# Patient Record
Sex: MALE | URBAN - METROPOLITAN AREA
[De-identification: names, ages, dates, MRNs, and addresses within clinical notes are randomized per-mention and may not be internally consistent; named-entity substitution may affect disease eponyms.]

---

## 2024-01-11 ENCOUNTER — HOSPITAL ENCOUNTER (OUTPATIENT)
Dept: PREADMISSION TESTING | Age: 54
Discharge: HOME | End: 2024-01-11
Payer: COMMERCIAL

## 2024-01-11 VITALS
TEMPERATURE: 97.1 F | HEART RATE: 67 BPM | WEIGHT: 236 LBS | DIASTOLIC BLOOD PRESSURE: 90 MMHG | OXYGEN SATURATION: 100 % | RESPIRATION RATE: 16 BRPM | SYSTOLIC BLOOD PRESSURE: 146 MMHG | HEIGHT: 74 IN | BODY MASS INDEX: 30.29 KG/M2

## 2024-01-11 LAB
AMORPH SED URNS QL MICRO: ABNORMAL
BILIRUB UR QL STRIP: NEGATIVE
CLARITY UR: ABNORMAL
COLOR UR: YELLOW
EPI CELLS #/AREA URNS HPF: ABNORMAL /HPF (ref 0–5)
GLUCOSE UR STRIP-MCNC: NEGATIVE MG/DL
HGB UR QL STRIP.AUTO: ABNORMAL
INR PPP: 1
KETONES UR STRIP-MCNC: NEGATIVE MG/DL
LEUKOCYTE ESTERASE UR QL STRIP: NEGATIVE
NITRITE UR QL STRIP: NEGATIVE
PARTIAL THROMBOPLASTIN TIME: 26.4 SEC (ref 23.9–33.8)
PH UR STRIP: 5.5 [PH] (ref 5–8)
PROT UR STRIP-MCNC: ABNORMAL MG/DL
PROTHROMBIN TIME: 13.4 SEC (ref 11.5–14.2)
RBC #/AREA URNS HPF: ABNORMAL /HPF (ref 0–2)
SP GR UR STRIP: 1.03 (ref 1–1.03)
UROBILINOGEN UR STRIP-ACNC: NORMAL EU/DL (ref 0–1)
WBC #/AREA URNS HPF: ABNORMAL /HPF (ref 0–5)

## 2024-01-11 PROCEDURE — 36415 COLL VENOUS BLD VENIPUNCTURE: CPT

## 2024-01-11 PROCEDURE — 87077 CULTURE AEROBIC IDENTIFY: CPT

## 2024-01-11 PROCEDURE — 87186 SC STD MICRODIL/AGAR DIL: CPT

## 2024-01-11 PROCEDURE — 85730 THROMBOPLASTIN TIME PARTIAL: CPT

## 2024-01-11 PROCEDURE — 85610 PROTHROMBIN TIME: CPT

## 2024-01-11 PROCEDURE — 87086 URINE CULTURE/COLONY COUNT: CPT

## 2024-01-11 PROCEDURE — 81001 URINALYSIS AUTO W/SCOPE: CPT

## 2024-01-11 RX ORDER — CYCLOBENZAPRINE HCL 10 MG
10 TABLET ORAL 3 TIMES DAILY PRN
Status: ON HOLD | COMMUNITY
Start: 2022-05-11 | End: 2024-01-20

## 2024-01-11 RX ORDER — TEMAZEPAM 30 MG/1
30 CAPSULE ORAL NIGHTLY PRN
COMMUNITY
Start: 2022-11-07

## 2024-01-11 RX ORDER — METOPROLOL SUCCINATE 100 MG/1
100 TABLET, EXTENDED RELEASE ORAL DAILY
COMMUNITY

## 2024-01-11 RX ORDER — LISINOPRIL 10 MG/1
10 TABLET ORAL DAILY
COMMUNITY
Start: 2024-01-08

## 2024-01-11 RX ORDER — DICLOFENAC SODIUM 75 MG/1
75 TABLET, DELAYED RELEASE ORAL 2 TIMES DAILY PRN
Status: ON HOLD | COMMUNITY
End: 2024-01-20 | Stop reason: HOSPADM

## 2024-01-11 ASSESSMENT — PAIN DESCRIPTION - LOCATION: LOCATION: BACK

## 2024-01-11 ASSESSMENT — PAIN SCALES - GENERAL: PAINLEVEL_OUTOF10: 2

## 2024-01-11 NOTE — PRE-PROCEDURE INSTRUCTIONS
On the Day of Your Surgery, Friday, 1/19/24, Please Arrive At 11:25 AM     Enter the hospital through the Main Entrance, take the lobby elevators to the second floor and check in at the Surgery Registration desk.     Continue to take your home medications as you normally do up to and including the night before surgery with the exception of blood thinning medications.    Blood Thinning Medications:  Please stop prescription blood thinning medications such as Apixaban (Eliquis); Clopidogrel (Plavix); Dabigatran (Pradaxa); Prasugrel (Effient); Rivaroxaban (Xarelto); Ticagrelor (Brilinta); Warfarin (Coumadin) only as directed by your surgeon and/or the prescribing physician    Some common examples of other medications that can thin your blood are: Aspirin, Ibuprofen (Advil, Motrin), Naproxen (Aleve), Meloxicam (Mobic), Celecoxib (Celebrex), Fish Oil, many Herbal Supplements.  These medications should usually be stopped at least 7 days prior to surgery.    Stop Aleve and Voltaren seven days prior to surgery    Tylenol is OK to take for pain the week prior to surgery.    Failure to stop certain medications may interfere with your scheduled surgery.    If you receive instructions from your surgeon regarding what medications to stop prior to surgery, please follow those specific instructions.      Please take the following medication(s) the day of surgery with small sips of water:              Metoprolol and Flexeril      Showering Before Surgery:     You can play an important role in your own health by carefully washing before surgery. Shower the night before and the morning of surgery using the instructions below. If you are allergic to Chlorhexidine Gluconate (CHG) use antibacterial soap instead.    If you were given Chlorhexidine soap, please follow the instructions included with the soap to shower the night before and the morning of surgery.  If you were not given Chlorhexidine, please shower or bathe the morning of  surgery using an antibacterial soap.  Please dress in clean clothing after showering.     General information about Chlorhexidine Gluconate (CHG)   * It should not be used on hair, face, ears, genital area or skin that is not intact.   * It should not be used if breast feeding.   * It should not be used if you allergic to CHG.   * See the bottle for additional information.    Do Not apply any powder, deodorant, lotion/cream/oil, perfume/aftershave, cosmetics, or alcohol-based skin or hair products after showering.    Do Not shave near the planned surgical site unless specifically instructed to.     1. Wash your hair using your normal shampoo and rinse.   2. Wash your face and genital area (privates) using your own shampoo and rinse.   3. Turn off the shower water and pour half of the bottle of CHG onto a clean washcloth.   4. Wash your body from neck down to toes. Pay special attention to your surgical site.   5. Leave the CHG on your skin for 5 minutes and rinse it off.   6. Pat dry with a clean towel, sleep in clean clothes and clean sheets.   7. Do not shave near the surgical site.   8. Repeat process the morning of surgery.    CHG may cause dry skin, but should not cause redness, rash, or intense itching. If an suspect an allergic reaction, use your own soap and shampoo for the morning of surgery and report reaction to the pre-operative nurse.      Additional Instructions:      1. If you are having any type of anesthesia, you are to have NOTHING to eat or drink after midnight the night before surgery.  This includes no gum, hard candy, mints or water.  The only exception to this is small sips of water to take the medications listed above.  No smoking or chewing tobacco after midnight.  No alcoholic beverages for 24 hours prior to surgery.    2. You may brush your teeth but do not swallow the water.    3. If you wear glasses bring a case for them if you have one.  No contacts should be worn the day of surgery.

## 2024-01-11 NOTE — PROGRESS NOTES
PAT Progress Note    Pt Name: Azar Aguiar  MRN: [id number]  YOB: 1970  Date of evaluation: 1/11/2024      [x] Called to [facility]YAN. I spoke to the patient, Azar Aguiar, a 53 y.o. male, who is scheduled for an upcoming L 2-5 LUMBAR LAMINECTOMY POSTERIOR by Jayesh Smallwood MD for Spinal stenosis, lumbar on 01/19/2024 at 1325.     [x] The patient had a cardiology appointment with Dr. Jameson yesterday 01/10/2024 to be used for an Interval History and Physical Note the day of surgery.     Patient was recently admitted at [facility] for complaints of dizziness and hypertension as well as tachycardia. Per patient he had been heavily drinking the night before. Workup in the hospital was negative and patient was discharge and followed up with Dr. Jameson yesterday. He has long-standing history of palpitations and has had multiple Holter monitors in the past with no abnormal findings.     Ultrasound abdomen limited 01/07/2024:    Impression:     Diffusely increased and coarsened hepatic echotexture compatible with diffuse hepatocellular disease.     No biliary dilatation. Unremarkable gallbladder.     Functional Capacity per pt:  1) Pt is able to walk 2 city blocks on level ground without SOB.  2) Pt is able to climb 2 flights of stairs without SOB.  3) Pt is able to walk up a hill for 1-2 city blocks without SOB.    Vital signs: BP (!) 146/90   Pulse 67   Temp 97.1 °F (36.2 °C) (Temporal)   Resp 16   Ht 1.88 m (6' 2\")   Wt 107 kg (236 lb)   SpO2 100%   BMI 30.30 kg/m²     Physical Exam:     General Appearance:  Alert, well appearing, and in no acute distress.  Mental status: Oriented to person, place, and time.  Lungs: Bilateral equal air entry, clear to auscultation, no wheezing, rales or rhonchi, and normal effort.  Cardiovascular: Normal rate, regular rhythm, no murmur, gallop, or rub.  Abdomen: Soft, nontender, nondistended, and active bowel sounds.    Past Medical History:     Past Medical History:

## 2024-01-13 LAB
MICROORGANISM SPEC CULT: ABNORMAL
SPECIMEN DESCRIPTION: ABNORMAL

## 2024-01-17 NOTE — DISCHARGE INSTRUCTIONS
No bending or twisting back for 6 weeks  No lifting over 15 pounds for 6 weeks  Dry dressing changes daily x 1wk. Start in 2 days  No NSAIDS x 5 days  Stiches are dissolvable and do not need to be removed  Call for any fevers, wound redness, swelling or drainage after 4 days [phone]  May shower in 2 days  No tub baths for 6 weeks      Jayesh Smallwood MD  [facility]  Spine Surgeon      Keep it Clean - Post-Operative Home instructions    These instructions are to help you have the best possible recovery after your surgical procedure. [facility] is here to support you. If you have questions, call [phone] Monday through Friday from 7:30AM to 8:30PM to speak to a nurse. If you need to speak to someone outside of these hours, call your physician.     Incision Do’s and Don’ts  Do wash hands before and after dressing changes or when you have had any contact with your incision. Use hand  or antibacterial soap.  Do keep your incision clean and dry. It’s OK to wash the skin around your incision with mild soap and water.  Do change your dressing as you were told.   Do notify your doctor if the dressing becomes wet or dirty.  Do use a clean washcloth every time when cleaning your incision.   Do sleep on clean linens.  Do keep pets away from incision site.  Don’t sit in a bathtub, pool, or hot tub until your incision is fully closed and any drains are removed.  Don’t scrub, pick, scratch, or pull at your incision.  Don’t use oils, lotions, or creams on your incision unless your healthcare provider approves it.    Follow-up  You will have one or more follow-up visits with your healthcare provider. These are needed to check how well you’re healing. Your drain, stitches, or staples may also be removed during these visits. Do not miss your follow-up visit, even if you are feeling better.      Call your healthcare provider right away if you have the following:  Fever of 100.4°F (38°C) or higher,

## 2024-01-18 ENCOUNTER — ANESTHESIA EVENT (OUTPATIENT)
Dept: OPERATING ROOM | Age: 54
End: 2024-01-18
Payer: COMMERCIAL

## 2024-01-19 ENCOUNTER — ANESTHESIA (OUTPATIENT)
Dept: OPERATING ROOM | Age: 54
End: 2024-01-19
Payer: COMMERCIAL

## 2024-01-19 ENCOUNTER — HOSPITAL ENCOUNTER (OUTPATIENT)
Age: 54
Discharge: HOME | End: 2024-01-20
Attending: ORTHOPAEDIC SURGERY | Admitting: ORTHOPAEDIC SURGERY
Payer: COMMERCIAL

## 2024-01-19 ENCOUNTER — APPOINTMENT (OUTPATIENT)
Dept: GENERAL RADIOLOGY | Age: 54
End: 2024-01-19
Attending: ORTHOPAEDIC SURGERY
Payer: COMMERCIAL

## 2024-01-19 DIAGNOSIS — M48.062 LUMBAR STENOSIS WITH NEUROGENIC CLAUDICATION: Primary | ICD-10-CM

## 2024-01-19 PROBLEM — R00.0 TACHYCARDIA, UNSPECIFIED: Status: ACTIVE | Noted: 2024-01-19

## 2024-01-19 PROBLEM — I10 HYPERTENSION: Status: ACTIVE | Noted: 2024-01-19

## 2024-01-19 PROBLEM — N39.0 UTI (URINARY TRACT INFECTION): Status: ACTIVE | Noted: 2024-01-19

## 2024-01-19 PROBLEM — F10.90 ALCOHOL USE: Status: ACTIVE | Noted: 2024-01-19

## 2024-01-19 PROBLEM — Z78.9 ALCOHOL USE: Status: ACTIVE | Noted: 2024-01-19

## 2024-01-19 LAB
ABO + RH BLD: NORMAL
ARM BAND NUMBER: NORMAL
BLOOD BANK SAMPLE EXPIRATION: NORMAL
BLOOD GROUP ANTIBODIES SERPL: NEGATIVE
HCT VFR BLD AUTO: 34.6 % (ref 40.7–50.3)
HGB BLD-MCNC: 12.3 G/DL (ref 13–17)

## 2024-01-19 PROCEDURE — 6360000002 HC RX W HCPCS: Performed by: SPECIALIST

## 2024-01-19 PROCEDURE — 6360000002 HC RX W HCPCS: Performed by: ORTHOPAEDIC SURGERY

## 2024-01-19 PROCEDURE — 86850 RBC ANTIBODY SCREEN: CPT

## 2024-01-19 PROCEDURE — 2500000003 HC RX 250 WO HCPCS: Performed by: SPECIALIST

## 2024-01-19 PROCEDURE — 2500000003 HC RX 250 WO HCPCS: Performed by: NURSE ANESTHETIST, CERTIFIED REGISTERED

## 2024-01-19 PROCEDURE — 6360000002 HC RX W HCPCS: Performed by: ANESTHESIOLOGY

## 2024-01-19 PROCEDURE — 2580000003 HC RX 258: Performed by: ANESTHESIOLOGY

## 2024-01-19 PROCEDURE — 86901 BLOOD TYPING SEROLOGIC RH(D): CPT

## 2024-01-19 PROCEDURE — A4216 STERILE WATER/SALINE, 10 ML: HCPCS | Performed by: ORTHOPAEDIC SURGERY

## 2024-01-19 PROCEDURE — 86900 BLOOD TYPING SEROLOGIC ABO: CPT

## 2024-01-19 PROCEDURE — 2580000003 HC RX 258: Performed by: ORTHOPAEDIC SURGERY

## 2024-01-19 PROCEDURE — 2500000003 HC RX 250 WO HCPCS: Performed by: ORTHOPAEDIC SURGERY

## 2024-01-19 PROCEDURE — P9041 ALBUMIN (HUMAN),5%, 50ML: HCPCS | Performed by: SPECIALIST

## 2024-01-19 PROCEDURE — 6370000000 HC RX 637 (ALT 250 FOR IP): Performed by: ORTHOPAEDIC SURGERY

## 2024-01-19 PROCEDURE — 85014 HEMATOCRIT: CPT

## 2024-01-19 PROCEDURE — 6360000002 HC RX W HCPCS: Performed by: NURSE ANESTHETIST, CERTIFIED REGISTERED

## 2024-01-19 PROCEDURE — 85018 HEMOGLOBIN: CPT

## 2024-01-19 RX ORDER — CYCLOBENZAPRINE HCL 10 MG
10 TABLET ORAL 3 TIMES DAILY PRN
Status: DISCONTINUED | OUTPATIENT
Start: 2024-01-19 | End: 2024-01-20 | Stop reason: HOSPADM

## 2024-01-19 RX ORDER — SODIUM CHLORIDE 9 MG/ML
INJECTION, SOLUTION INTRAVENOUS PRN
Status: DISCONTINUED | OUTPATIENT
Start: 2024-01-19 | End: 2024-01-20 | Stop reason: HOSPADM

## 2024-01-19 RX ORDER — SODIUM CHLORIDE 0.9 % (FLUSH) 0.9 %
5-40 SYRINGE (ML) INJECTION EVERY 12 HOURS SCHEDULED
Status: DISCONTINUED | OUTPATIENT
Start: 2024-01-19 | End: 2024-01-20 | Stop reason: HOSPADM

## 2024-01-19 RX ORDER — ALPRAZOLAM 0.25 MG/1
0.25 TABLET ORAL 2 TIMES DAILY PRN
Status: DISCONTINUED | OUTPATIENT
Start: 2024-01-19 | End: 2024-01-20 | Stop reason: HOSPADM

## 2024-01-19 RX ORDER — ESCITALOPRAM OXALATE 10 MG/1
10 TABLET ORAL DAILY
Status: DISCONTINUED | OUTPATIENT
Start: 2024-01-20 | End: 2024-01-20 | Stop reason: HOSPADM

## 2024-01-19 RX ORDER — SODIUM CHLORIDE 9 MG/ML
INJECTION INTRAVENOUS PRN
Status: DISCONTINUED | OUTPATIENT
Start: 2024-01-19 | End: 2024-01-19 | Stop reason: ALTCHOICE

## 2024-01-19 RX ORDER — PROPOFOL 10 MG/ML
INJECTION, EMULSION INTRAVENOUS PRN
Status: DISCONTINUED | OUTPATIENT
Start: 2024-01-19 | End: 2024-01-19 | Stop reason: SDUPTHER

## 2024-01-19 RX ORDER — SODIUM CHLORIDE 0.9 % (FLUSH) 0.9 %
5-40 SYRINGE (ML) INJECTION PRN
Status: DISCONTINUED | OUTPATIENT
Start: 2024-01-19 | End: 2024-01-20 | Stop reason: HOSPADM

## 2024-01-19 RX ORDER — DEXAMETHASONE SODIUM PHOSPHATE 10 MG/ML
INJECTION, SOLUTION INTRAMUSCULAR; INTRAVENOUS PRN
Status: DISCONTINUED | OUTPATIENT
Start: 2024-01-19 | End: 2024-01-19 | Stop reason: SDUPTHER

## 2024-01-19 RX ORDER — ESCITALOPRAM OXALATE 10 MG/1
10 TABLET ORAL DAILY
COMMUNITY
Start: 2024-01-16

## 2024-01-19 RX ORDER — VANCOMYCIN HYDROCHLORIDE 1 G/20ML
INJECTION, POWDER, LYOPHILIZED, FOR SOLUTION INTRAVENOUS PRN
Status: DISCONTINUED | OUTPATIENT
Start: 2024-01-19 | End: 2024-01-19 | Stop reason: ALTCHOICE

## 2024-01-19 RX ORDER — LEVOFLOXACIN 5 MG/ML
750 INJECTION, SOLUTION INTRAVENOUS EVERY 24 HOURS
Status: DISCONTINUED | OUTPATIENT
Start: 2024-01-19 | End: 2024-01-19

## 2024-01-19 RX ORDER — SODIUM CHLORIDE 9 MG/ML
INJECTION, SOLUTION INTRAVENOUS PRN
Status: DISCONTINUED | OUTPATIENT
Start: 2024-01-19 | End: 2024-01-19 | Stop reason: HOSPADM

## 2024-01-19 RX ORDER — CIPROFLOXACIN 500 MG/1
500 TABLET, FILM COATED ORAL EVERY 12 HOURS
COMMUNITY
Start: 2024-01-16

## 2024-01-19 RX ORDER — SODIUM CHLORIDE 9 MG/ML
INJECTION, SOLUTION INTRAVENOUS CONTINUOUS
Status: DISCONTINUED | OUTPATIENT
Start: 2024-01-19 | End: 2024-01-20

## 2024-01-19 RX ORDER — SODIUM CHLORIDE, SODIUM LACTATE, POTASSIUM CHLORIDE, CALCIUM CHLORIDE 600; 310; 30; 20 MG/100ML; MG/100ML; MG/100ML; MG/100ML
INJECTION, SOLUTION INTRAVENOUS CONTINUOUS
Status: DISCONTINUED | OUTPATIENT
Start: 2024-01-19 | End: 2024-01-19

## 2024-01-19 RX ORDER — MIDAZOLAM HYDROCHLORIDE 1 MG/ML
INJECTION INTRAMUSCULAR; INTRAVENOUS PRN
Status: DISCONTINUED | OUTPATIENT
Start: 2024-01-19 | End: 2024-01-19 | Stop reason: SDUPTHER

## 2024-01-19 RX ORDER — CIPROFLOXACIN 500 MG/1
500 TABLET, FILM COATED ORAL EVERY 12 HOURS SCHEDULED
Status: DISCONTINUED | OUTPATIENT
Start: 2024-01-19 | End: 2024-01-20

## 2024-01-19 RX ORDER — ROCURONIUM BROMIDE 10 MG/ML
INJECTION, SOLUTION INTRAVENOUS PRN
Status: DISCONTINUED | OUTPATIENT
Start: 2024-01-19 | End: 2024-01-19 | Stop reason: SDUPTHER

## 2024-01-19 RX ORDER — MORPHINE SULFATE 4 MG/ML
4 INJECTION, SOLUTION INTRAMUSCULAR; INTRAVENOUS
Status: DISCONTINUED | OUTPATIENT
Start: 2024-01-19 | End: 2024-01-20 | Stop reason: HOSPADM

## 2024-01-19 RX ORDER — LISINOPRIL 10 MG/1
10 TABLET ORAL DAILY
Status: DISCONTINUED | OUTPATIENT
Start: 2024-01-20 | End: 2024-01-20 | Stop reason: HOSPADM

## 2024-01-19 RX ORDER — FENTANYL CITRATE 50 UG/ML
INJECTION, SOLUTION INTRAMUSCULAR; INTRAVENOUS PRN
Status: DISCONTINUED | OUTPATIENT
Start: 2024-01-19 | End: 2024-01-19 | Stop reason: SDUPTHER

## 2024-01-19 RX ORDER — FENTANYL CITRATE 50 UG/ML
25 INJECTION, SOLUTION INTRAMUSCULAR; INTRAVENOUS EVERY 5 MIN PRN
Status: DISCONTINUED | OUTPATIENT
Start: 2024-01-19 | End: 2024-01-19 | Stop reason: HOSPADM

## 2024-01-19 RX ORDER — SODIUM CHLORIDE 0.9 % (FLUSH) 0.9 %
5-40 SYRINGE (ML) INJECTION EVERY 12 HOURS SCHEDULED
Status: DISCONTINUED | OUTPATIENT
Start: 2024-01-19 | End: 2024-01-19 | Stop reason: HOSPADM

## 2024-01-19 RX ORDER — HYDROMORPHONE HYDROCHLORIDE 1 MG/ML
0.5 INJECTION, SOLUTION INTRAMUSCULAR; INTRAVENOUS; SUBCUTANEOUS EVERY 5 MIN PRN
Status: DISCONTINUED | OUTPATIENT
Start: 2024-01-19 | End: 2024-01-19 | Stop reason: HOSPADM

## 2024-01-19 RX ORDER — FOLIC ACID 1 MG/1
1 TABLET ORAL DAILY
Status: DISCONTINUED | OUTPATIENT
Start: 2024-01-19 | End: 2024-01-20 | Stop reason: HOSPADM

## 2024-01-19 RX ORDER — ONDANSETRON 2 MG/ML
4 INJECTION INTRAMUSCULAR; INTRAVENOUS
Status: DISCONTINUED | OUTPATIENT
Start: 2024-01-19 | End: 2024-01-19 | Stop reason: HOSPADM

## 2024-01-19 RX ORDER — METOPROLOL SUCCINATE 50 MG/1
100 TABLET, EXTENDED RELEASE ORAL DAILY
Status: DISCONTINUED | OUTPATIENT
Start: 2024-01-20 | End: 2024-01-20 | Stop reason: HOSPADM

## 2024-01-19 RX ORDER — ONDANSETRON 2 MG/ML
INJECTION INTRAMUSCULAR; INTRAVENOUS PRN
Status: DISCONTINUED | OUTPATIENT
Start: 2024-01-19 | End: 2024-01-19 | Stop reason: SDUPTHER

## 2024-01-19 RX ORDER — SODIUM CHLORIDE 9 MG/ML
INJECTION, SOLUTION INTRAVENOUS CONTINUOUS
Status: DISCONTINUED | OUTPATIENT
Start: 2024-01-19 | End: 2024-01-19

## 2024-01-19 RX ORDER — LIDOCAINE HYDROCHLORIDE 10 MG/ML
1 INJECTION, SOLUTION EPIDURAL; INFILTRATION; INTRACAUDAL; PERINEURAL
Status: DISCONTINUED | OUTPATIENT
Start: 2024-01-19 | End: 2024-01-19 | Stop reason: HOSPADM

## 2024-01-19 RX ORDER — SENNA AND DOCUSATE SODIUM 50; 8.6 MG/1; MG/1
1 TABLET, FILM COATED ORAL 2 TIMES DAILY
Status: DISCONTINUED | OUTPATIENT
Start: 2024-01-19 | End: 2024-01-20 | Stop reason: HOSPADM

## 2024-01-19 RX ORDER — POLYETHYLENE GLYCOL 3350 17 G/17G
17 POWDER, FOR SOLUTION ORAL DAILY
Status: DISCONTINUED | OUTPATIENT
Start: 2024-01-20 | End: 2024-01-20 | Stop reason: HOSPADM

## 2024-01-19 RX ORDER — DEXAMETHASONE SODIUM PHOSPHATE 10 MG/ML
6 INJECTION, SOLUTION INTRAMUSCULAR; INTRAVENOUS EVERY 8 HOURS
Status: DISCONTINUED | OUTPATIENT
Start: 2024-01-19 | End: 2024-01-20 | Stop reason: HOSPADM

## 2024-01-19 RX ORDER — GAUZE BANDAGE 2" X 2"
100 BANDAGE TOPICAL DAILY
Status: DISCONTINUED | OUTPATIENT
Start: 2024-01-19 | End: 2024-01-20 | Stop reason: HOSPADM

## 2024-01-19 RX ORDER — ALPRAZOLAM 0.25 MG/1
0.25 TABLET ORAL 2 TIMES DAILY PRN
COMMUNITY
Start: 2024-01-16

## 2024-01-19 RX ORDER — SODIUM CHLORIDE 0.9 % (FLUSH) 0.9 %
5-40 SYRINGE (ML) INJECTION PRN
Status: DISCONTINUED | OUTPATIENT
Start: 2024-01-19 | End: 2024-01-19 | Stop reason: HOSPADM

## 2024-01-19 RX ORDER — LEVOFLOXACIN 5 MG/ML
750 INJECTION, SOLUTION INTRAVENOUS EVERY 24 HOURS
Status: COMPLETED | OUTPATIENT
Start: 2024-01-19 | End: 2024-01-20

## 2024-01-19 RX ORDER — ALBUMIN, HUMAN INJ 5% 5 %
SOLUTION INTRAVENOUS PRN
Status: DISCONTINUED | OUTPATIENT
Start: 2024-01-19 | End: 2024-01-19 | Stop reason: SDUPTHER

## 2024-01-19 RX ORDER — PROMETHAZINE HYDROCHLORIDE 12.5 MG/1
12.5 TABLET ORAL EVERY 6 HOURS PRN
Status: DISCONTINUED | OUTPATIENT
Start: 2024-01-19 | End: 2024-01-20 | Stop reason: HOSPADM

## 2024-01-19 RX ORDER — ONDANSETRON 2 MG/ML
4 INJECTION INTRAMUSCULAR; INTRAVENOUS EVERY 6 HOURS PRN
Status: DISCONTINUED | OUTPATIENT
Start: 2024-01-19 | End: 2024-01-20 | Stop reason: HOSPADM

## 2024-01-19 RX ORDER — EPHEDRINE SULFATE/0.9% NACL/PF 50 MG/5 ML
SYRINGE (ML) INTRAVENOUS PRN
Status: DISCONTINUED | OUTPATIENT
Start: 2024-01-19 | End: 2024-01-19 | Stop reason: SDUPTHER

## 2024-01-19 RX ORDER — LIDOCAINE HYDROCHLORIDE 20 MG/ML
INJECTION, SOLUTION EPIDURAL; INFILTRATION; INTRACAUDAL; PERINEURAL PRN
Status: DISCONTINUED | OUTPATIENT
Start: 2024-01-19 | End: 2024-01-19 | Stop reason: SDUPTHER

## 2024-01-19 RX ORDER — OXYCODONE HYDROCHLORIDE AND ACETAMINOPHEN 5; 325 MG/1; MG/1
2 TABLET ORAL EVERY 4 HOURS PRN
Status: DISCONTINUED | OUTPATIENT
Start: 2024-01-19 | End: 2024-01-20 | Stop reason: HOSPADM

## 2024-01-19 RX ORDER — MORPHINE SULFATE 2 MG/ML
2 INJECTION, SOLUTION INTRAMUSCULAR; INTRAVENOUS
Status: DISCONTINUED | OUTPATIENT
Start: 2024-01-19 | End: 2024-01-20 | Stop reason: HOSPADM

## 2024-01-19 RX ORDER — HYDROXYZINE HYDROCHLORIDE 10 MG/1
10 TABLET, FILM COATED ORAL EVERY 8 HOURS PRN
Status: DISCONTINUED | OUTPATIENT
Start: 2024-01-19 | End: 2024-01-20 | Stop reason: HOSPADM

## 2024-01-19 RX ORDER — OXYCODONE HYDROCHLORIDE AND ACETAMINOPHEN 5; 325 MG/1; MG/1
1 TABLET ORAL EVERY 4 HOURS PRN
Status: DISCONTINUED | OUTPATIENT
Start: 2024-01-19 | End: 2024-01-20 | Stop reason: HOSPADM

## 2024-01-19 RX ORDER — BUPIVACAINE HYDROCHLORIDE AND EPINEPHRINE 5; 5 MG/ML; UG/ML
INJECTION, SOLUTION EPIDURAL; INTRACAUDAL; PERINEURAL PRN
Status: DISCONTINUED | OUTPATIENT
Start: 2024-01-19 | End: 2024-01-19 | Stop reason: ALTCHOICE

## 2024-01-19 RX ADMIN — MIDAZOLAM 2 MG: 1 INJECTION INTRAMUSCULAR; INTRAVENOUS at 14:59

## 2024-01-19 RX ADMIN — PROPOFOL 80 MG: 10 INJECTION, EMULSION INTRAVENOUS at 18:39

## 2024-01-19 RX ADMIN — LIDOCAINE HYDROCHLORIDE 60 MG: 20 INJECTION, SOLUTION EPIDURAL; INFILTRATION; INTRACAUDAL; PERINEURAL at 15:05

## 2024-01-19 RX ADMIN — Medication 10 MG: at 16:53

## 2024-01-19 RX ADMIN — SODIUM CHLORIDE 2000 MG: 9 INJECTION, SOLUTION INTRAVENOUS at 22:41

## 2024-01-19 RX ADMIN — SODIUM CHLORIDE, POTASSIUM CHLORIDE, SODIUM LACTATE AND CALCIUM CHLORIDE: 600; 310; 30; 20 INJECTION, SOLUTION INTRAVENOUS at 16:55

## 2024-01-19 RX ADMIN — PROPOFOL 200 MG: 10 INJECTION, EMULSION INTRAVENOUS at 15:05

## 2024-01-19 RX ADMIN — ROCURONIUM BROMIDE 50 MG: 10 INJECTION, SOLUTION INTRAVENOUS at 15:05

## 2024-01-19 RX ADMIN — DEXAMETHASONE SODIUM PHOSPHATE 6 MG: 10 INJECTION, SOLUTION INTRAMUSCULAR; INTRAVENOUS at 22:09

## 2024-01-19 RX ADMIN — SODIUM CHLORIDE, POTASSIUM CHLORIDE, SODIUM LACTATE AND CALCIUM CHLORIDE: 600; 310; 30; 20 INJECTION, SOLUTION INTRAVENOUS at 12:16

## 2024-01-19 RX ADMIN — Medication 10 MG: at 18:04

## 2024-01-19 RX ADMIN — FENTANYL CITRATE 100 MCG: 50 INJECTION, SOLUTION INTRAMUSCULAR; INTRAVENOUS at 15:05

## 2024-01-19 RX ADMIN — DEXAMETHASONE SODIUM PHOSPHATE 10 MG: 10 INJECTION INTRAMUSCULAR; INTRAVENOUS at 15:05

## 2024-01-19 RX ADMIN — LEVOFLOXACIN 750 MG: 5 INJECTION, SOLUTION INTRAVENOUS at 12:29

## 2024-01-19 RX ADMIN — FENTANYL CITRATE 150 MCG: 50 INJECTION, SOLUTION INTRAMUSCULAR; INTRAVENOUS at 15:10

## 2024-01-19 RX ADMIN — ONDANSETRON 4 MG: 2 INJECTION INTRAMUSCULAR; INTRAVENOUS at 18:14

## 2024-01-19 RX ADMIN — FENTANYL CITRATE 50 MCG: 50 INJECTION, SOLUTION INTRAMUSCULAR; INTRAVENOUS at 18:42

## 2024-01-19 RX ADMIN — SODIUM CHLORIDE: 9 INJECTION, SOLUTION INTRAVENOUS at 22:09

## 2024-01-19 RX ADMIN — CIPROFLOXACIN 500 MG: 500 TABLET ORAL at 22:09

## 2024-01-19 RX ADMIN — Medication 2000 MG: at 15:01

## 2024-01-19 RX ADMIN — Medication 10 MG: at 16:41

## 2024-01-19 RX ADMIN — ALBUMIN (HUMAN) 25 G: 12.5 INJECTION, SOLUTION INTRAVENOUS at 16:54

## 2024-01-19 RX ADMIN — DOCUSATE SODIUM 50MG AND SENNOSIDES 8.6MG 1 TABLET: 8.6; 5 TABLET, FILM COATED ORAL at 22:09

## 2024-01-19 RX ADMIN — PROPOFOL 130 MG: 10 INJECTION, EMULSION INTRAVENOUS at 15:09

## 2024-01-19 RX ADMIN — ROCURONIUM BROMIDE 30 MG: 10 INJECTION, SOLUTION INTRAVENOUS at 15:09

## 2024-01-19 RX ADMIN — FENTANYL CITRATE 50 MCG: 50 INJECTION, SOLUTION INTRAMUSCULAR; INTRAVENOUS at 17:31

## 2024-01-19 RX ADMIN — FENTANYL CITRATE 50 MCG: 50 INJECTION, SOLUTION INTRAMUSCULAR; INTRAVENOUS at 17:05

## 2024-01-19 RX ADMIN — Medication 10 MG: at 17:10

## 2024-01-19 ASSESSMENT — PAIN DESCRIPTION - DESCRIPTORS
DESCRIPTORS: SORE
DESCRIPTORS: ACHING;SORE
DESCRIPTORS: SORE

## 2024-01-19 ASSESSMENT — PAIN DESCRIPTION - ORIENTATION
ORIENTATION: LOWER

## 2024-01-19 ASSESSMENT — PAIN SCALES - GENERAL
PAINLEVEL_OUTOF10: 2
PAINLEVEL_OUTOF10: 4

## 2024-01-19 ASSESSMENT — PAIN DESCRIPTION - LOCATION
LOCATION: BACK

## 2024-01-19 ASSESSMENT — PAIN - FUNCTIONAL ASSESSMENT
PAIN_FUNCTIONAL_ASSESSMENT: 0-10
PAIN_FUNCTIONAL_ASSESSMENT: PREVENTS OR INTERFERES SOME ACTIVE ACTIVITIES AND ADLS

## 2024-01-19 ASSESSMENT — LIFESTYLE VARIABLES: SMOKING_STATUS: 0

## 2024-01-19 ASSESSMENT — PAIN DESCRIPTION - ONSET: ONSET: ON-GOING

## 2024-01-19 ASSESSMENT — PAIN DESCRIPTION - FREQUENCY: FREQUENCY: CONTINUOUS

## 2024-01-19 ASSESSMENT — PAIN DESCRIPTION - PAIN TYPE: TYPE: SURGICAL PAIN

## 2024-01-19 NOTE — ANESTHESIA PRE PROCEDURE
Department of Anesthesiology  Preprocedure Note       Name:  Azar Aguiar   Age:  53 y.o.  :  1970                                          MRN:  [id number]         Date:  2024      Surgeon: Surgeon(s):  Jayesh Smallwood MD    Procedure: Procedure(s):  L 2-5 LUMBAR LAMINECTOMY POSTERIOR    Medications prior to admission:   Prior to Admission medications    Medication Sig Start Date End Date Taking? Authorizing Provider   ALPRAZolam (XANAX) 0.25 MG tablet Take 1 tablet by mouth 2 times daily as needed. 24   Willam Perkins MD   ciprofloxacin (CIPRO) 500 MG tablet Take 1 tablet by mouth in the morning and 1 tablet in the evening. for 7 days. 24   Willam Perkins MD   escitalopram (LEXAPRO) 10 MG tablet Take 1 tablet by mouth daily 24   Willam Perkins MD   diclofenac (VOLTAREN) 75 MG EC tablet Take 1 tablet by mouth 2 times daily as needed for Pain    Willam Perkins MD   cyclobenzaprine (FLEXERIL) 10 MG tablet Take 1 tablet by mouth 3 times daily as needed for Muscle spasms 22   Willam Perkins MD   metoprolol succinate (TOPROL XL) 100 MG extended release tablet Take 1 tablet by mouth daily    Willam Perkins MD   lisinopril (PRINIVIL;ZESTRIL) 10 MG tablet Take 1 tablet by mouth daily 24   Willam Perkins MD   temazepam (RESTORIL) 30 MG capsule Take 1 capsule by mouth nightly as needed for Sleep. 22   Willam Perkins MD       Current medications:    Current Facility-Administered Medications   Medication Dose Route Frequency Provider Last Rate Last Admin   • lidocaine PF 1 % injection 1 mL  1 mL IntraDERmal Once PRN Dulce Conklin MD       • lactated ringers IV soln infusion   IntraVENous Continuous Dulce Conklin  mL/hr at 24 1216 New Bag at 24 1216   • sodium chloride flush 0.9 % injection 5-40 mL  5-40 mL IntraVENous 2 times per day Dulce Conklin MD       • sodium chloride flush 0.9 % injection 5-40 mL

## 2024-01-19 NOTE — BRIEF OP NOTE
Brief Postoperative Note      Patient: Azar Aguiar  YOB: 1970  MRN: [id number]    Date of Procedure: 1/19/2024    Pre-Op Diagnosis Codes:     * Spinal stenosis of lumbar region, unspecified whether neurogenic claudication present [M48.061]    Post-Op Diagnosis: Same       Procedure(s):  L 2-5 LUMBAR LAMINECTOMY POSTERIOR    Surgeon(s):  Sara Smallwood MD    Assistant:  First Assistant: Ulises Waters Ashlie    Anesthesia: General    Estimated Blood Loss (mL): 1400ml    Complications: None    Specimens:   * No specimens in log *    Implants:  * No implants in log *      Drains:   Closed/Suction Drain Left Back Accordion (Active)           Electronically signed by SARA SMALLWOOD MD on 1/19/2024 at 6:25 PM

## 2024-01-19 NOTE — H&P
Interval H&P Note    Pt Name: Azar Aguiar  MRN: [id number]  YOB: 1970  Date of evaluation: 1/19/2024      [x] I have reviewed THE CARDIOLOGY CLEARANCE NOTE OF 1/10/2024 BY   WHICH MEETS CRITERIA FOR AN  Interval History and Physical note.AND IS AVAILABLE BY HARD COPY IN THE SHORT CHART.     [x] I have examined  Azar Aguiar, a 53 y.o. male.There are no changes to the patient who is scheduled for L 2-5 LUMBAR LAMINECTOMY POSTERIOR by Jayesh Smallwood MD for Spinal stenosis of lumbar region, unspecified whether neurogenic claudicat*. The patient denies new health changes, fever, chills, wheezing, cough, increased SOB, chest pain, open sores or wounds.HE DOES NOT TAKE BLOOD THINNERS,HE DOES NOT HAVE DIABETES.    Vital signs: BP (!) 172/91   Pulse 68   Temp 97.7 °F (36.5 °C)   Resp 18   Ht 1.88 m (6' 2\")   Wt 107 kg (236 lb)   SpO2 98%   BMI 30.30 kg/m²     Allergies:  Penicillins    Medications:    Prior to Admission medications    Medication Sig Start Date End Date Taking? Authorizing Provider   ALPRAZolam (XANAX) 0.25 MG tablet Take 1 tablet by mouth 2 times daily as needed. 1/16/24   Willam Perkins MD   ciprofloxacin (CIPRO) 500 MG tablet Take 1 tablet by mouth in the morning and 1 tablet in the evening. for 7 days. 1/16/24   Willam Perkins MD   escitalopram (LEXAPRO) 10 MG tablet Take 1 tablet by mouth daily 1/16/24   Willam Perkins MD   diclofenac (VOLTAREN) 75 MG EC tablet Take 1 tablet by mouth 2 times daily as needed for Pain    Willam Perkins MD   cyclobenzaprine (FLEXERIL) 10 MG tablet Take 1 tablet by mouth 3 times daily as needed for Muscle spasms 5/11/22   Willam Perkins MD   metoprolol succinate (TOPROL XL) 100 MG extended release tablet Take 1 tablet by mouth daily    Willam Perkins MD   lisinopril (PRINIVIL;ZESTRIL) 10 MG tablet Take 1 tablet by mouth daily 1/8/24   Wilalm Perkins MD   temazepam (RESTORIL) 30 MG capsule

## 2024-01-20 VITALS
RESPIRATION RATE: 16 BRPM | OXYGEN SATURATION: 97 % | TEMPERATURE: 98.1 F | HEART RATE: 69 BPM | BODY MASS INDEX: 30.29 KG/M2 | HEIGHT: 74 IN | DIASTOLIC BLOOD PRESSURE: 61 MMHG | WEIGHT: 236 LBS | SYSTOLIC BLOOD PRESSURE: 106 MMHG

## 2024-01-20 LAB
ANION GAP SERPL CALCULATED.3IONS-SCNC: 8 MMOL/L (ref 9–17)
BASOPHILS # BLD: 0 K/UL (ref 0–0.2)
BASOPHILS NFR BLD: 0 % (ref 0–2)
BUN SERPL-MCNC: 10 MG/DL (ref 6–20)
BUN/CREAT SERPL: 11 (ref 9–20)
CALCIUM SERPL-MCNC: 8.1 MG/DL (ref 8.6–10.4)
CHLORIDE SERPL-SCNC: 101 MMOL/L (ref 98–107)
CO2 SERPL-SCNC: 29 MMOL/L (ref 20–31)
CREAT SERPL-MCNC: 0.9 MG/DL (ref 0.7–1.2)
EOSINOPHIL # BLD: 0 K/UL (ref 0–0.44)
EOSINOPHILS RELATIVE PERCENT: 0 % (ref 1–4)
ERYTHROCYTE [DISTWIDTH] IN BLOOD BY AUTOMATED COUNT: 12 % (ref 11.8–14.4)
GFR SERPL CREATININE-BSD FRML MDRD: >60 ML/MIN/1.73M2
GLUCOSE SERPL-MCNC: 140 MG/DL (ref 70–99)
HCT VFR BLD AUTO: 30 % (ref 40.7–50.3)
HGB BLD-MCNC: 10.6 G/DL (ref 13–17)
IMM GRANULOCYTES # BLD AUTO: 0 K/UL (ref 0–0.3)
IMM GRANULOCYTES NFR BLD: 0 %
LYMPHOCYTES NFR BLD: 0.41 K/UL (ref 1.1–3.7)
LYMPHOCYTES RELATIVE PERCENT: 5 % (ref 24–43)
MCH RBC QN AUTO: 36.3 PG (ref 25.2–33.5)
MCHC RBC AUTO-ENTMCNC: 35.3 G/DL (ref 28.4–34.8)
MCV RBC AUTO: 102.7 FL (ref 82.6–102.9)
MONOCYTES NFR BLD: 0.16 K/UL (ref 0.1–1.2)
MONOCYTES NFR BLD: 2 % (ref 3–12)
NEUTROPHILS NFR BLD: 93 % (ref 36–65)
NEUTS SEG NFR BLD: 7.63 K/UL (ref 1.5–8.1)
NRBC BLD-RTO: 0 PER 100 WBC
PLATELET # BLD AUTO: 185 K/UL (ref 138–453)
PMV BLD AUTO: 9.4 FL (ref 8.1–13.5)
POTASSIUM SERPL-SCNC: 4.1 MMOL/L (ref 3.7–5.3)
RBC # BLD AUTO: 2.92 M/UL (ref 4.21–5.77)
SODIUM SERPL-SCNC: 138 MMOL/L (ref 135–144)
WBC OTHER # BLD: 8.2 K/UL (ref 3.5–11.3)

## 2024-01-20 PROCEDURE — 36415 COLL VENOUS BLD VENIPUNCTURE: CPT

## 2024-01-20 PROCEDURE — 99232 SBSQ HOSP IP/OBS MODERATE 35: CPT | Performed by: STUDENT IN AN ORGANIZED HEALTH CARE EDUCATION/TRAINING PROGRAM

## 2024-01-20 PROCEDURE — 6370000000 HC RX 637 (ALT 250 FOR IP): Performed by: NURSE PRACTITIONER

## 2024-01-20 PROCEDURE — 80048 BASIC METABOLIC PNL TOTAL CA: CPT

## 2024-01-20 PROCEDURE — 6370000000 HC RX 637 (ALT 250 FOR IP): Performed by: ORTHOPAEDIC SURGERY

## 2024-01-20 PROCEDURE — 6360000002 HC RX W HCPCS: Performed by: ORTHOPAEDIC SURGERY

## 2024-01-20 PROCEDURE — 97162 PT EVAL MOD COMPLEX 30 MIN: CPT

## 2024-01-20 PROCEDURE — 2580000003 HC RX 258: Performed by: ORTHOPAEDIC SURGERY

## 2024-01-20 PROCEDURE — 97116 GAIT TRAINING THERAPY: CPT

## 2024-01-20 PROCEDURE — 85025 COMPLETE CBC W/AUTO DIFF WBC: CPT

## 2024-01-20 RX ORDER — SENNA AND DOCUSATE SODIUM 50; 8.6 MG/1; MG/1
1 TABLET, FILM COATED ORAL 2 TIMES DAILY
Qty: 14 TABLET | Refills: 0 | Status: SHIPPED | OUTPATIENT
Start: 2024-01-20

## 2024-01-20 RX ORDER — OXYCODONE HYDROCHLORIDE AND ACETAMINOPHEN 5; 325 MG/1; MG/1
1-2 TABLET ORAL EVERY 4 HOURS PRN
Qty: 60 TABLET | Refills: 0 | Status: SHIPPED | OUTPATIENT
Start: 2024-01-20 | End: 2024-01-27

## 2024-01-20 RX ORDER — LANOLIN ALCOHOL/MO/W.PET/CERES
3 CREAM (GRAM) TOPICAL ONCE
Status: COMPLETED | OUTPATIENT
Start: 2024-01-20 | End: 2024-01-20

## 2024-01-20 RX ORDER — CYCLOBENZAPRINE HCL 10 MG
10 TABLET ORAL 3 TIMES DAILY PRN
Qty: 60 TABLET | Refills: 0 | Status: SHIPPED | OUTPATIENT
Start: 2024-01-20

## 2024-01-20 RX ORDER — CIPROFLOXACIN 500 MG/1
500 TABLET, FILM COATED ORAL EVERY 12 HOURS SCHEDULED
Status: DISCONTINUED | OUTPATIENT
Start: 2024-01-20 | End: 2024-01-20 | Stop reason: HOSPADM

## 2024-01-20 RX ADMIN — ESCITALOPRAM OXALATE 10 MG: 10 TABLET ORAL at 09:34

## 2024-01-20 RX ADMIN — Medication 3 MG: at 00:18

## 2024-01-20 RX ADMIN — METOPROLOL SUCCINATE 100 MG: 50 TABLET, EXTENDED RELEASE ORAL at 09:34

## 2024-01-20 RX ADMIN — LISINOPRIL 10 MG: 10 TABLET ORAL at 09:34

## 2024-01-20 RX ADMIN — Medication 100 MG: at 00:18

## 2024-01-20 RX ADMIN — CIPROFLOXACIN 500 MG: 500 TABLET ORAL at 09:34

## 2024-01-20 RX ADMIN — DOCUSATE SODIUM 50MG AND SENNOSIDES 8.6MG 1 TABLET: 8.6; 5 TABLET, FILM COATED ORAL at 09:34

## 2024-01-20 RX ADMIN — SODIUM CHLORIDE, PRESERVATIVE FREE 10 ML: 5 INJECTION INTRAVENOUS at 09:39

## 2024-01-20 RX ADMIN — FOLIC ACID 1 MG: 1 TABLET ORAL at 09:34

## 2024-01-20 RX ADMIN — POLYETHYLENE GLYCOL 3350 17 G: 17 POWDER, FOR SOLUTION ORAL at 09:34

## 2024-01-20 RX ADMIN — Medication 100 MG: at 09:34

## 2024-01-20 RX ADMIN — SODIUM CHLORIDE 2000 MG: 9 INJECTION, SOLUTION INTRAVENOUS at 05:39

## 2024-01-20 RX ADMIN — DEXAMETHASONE SODIUM PHOSPHATE 6 MG: 10 INJECTION, SOLUTION INTRAMUSCULAR; INTRAVENOUS at 05:36

## 2024-01-20 ASSESSMENT — PAIN SCALES - GENERAL: PAINLEVEL_OUTOF10: 1

## 2024-01-20 ASSESSMENT — PAIN DESCRIPTION - DESCRIPTORS: DESCRIPTORS: ACHING

## 2024-01-20 ASSESSMENT — ENCOUNTER SYMPTOMS
EYE ITCHING: 0
COUGH: 0
RHINORRHEA: 0
BACK PAIN: 0
ABDOMINAL PAIN: 0
BACK PAIN: 1
EYE DISCHARGE: 0
WHEEZING: 0
SHORTNESS OF BREATH: 0
ABDOMINAL DISTENTION: 0

## 2024-01-20 ASSESSMENT — PAIN DESCRIPTION - ORIENTATION: ORIENTATION: MID;LOWER

## 2024-01-20 ASSESSMENT — PAIN DESCRIPTION - LOCATION: LOCATION: BACK

## 2024-01-20 ASSESSMENT — PAIN DESCRIPTION - PAIN TYPE: TYPE: ACUTE PAIN;SURGICAL PAIN

## 2024-01-20 NOTE — ANESTHESIA POSTPROCEDURE EVALUATION
Department of Anesthesiology  Postprocedure Note    Patient: Azar Aguiar  MRN: [id number]  YOB: 1970  Date of evaluation: 1/19/2024    Procedure Summary       Date: 01/19/24 Room / Location: [address]    Anesthesia Start: 1459 Anesthesia Stop: 1906    Procedure: L 2-5 LUMBAR LAMINECTOMY POSTERIOR (Back) Diagnosis:       Spinal stenosis of lumbar region, unspecified whether neurogenic claudication present      (Spinal stenosis of lumbar region, unspecified whether neurogenic claudication present [M48.061])    Surgeons: Jayesh Smallwood MD Responsible Provider: Eve Abad MD    Anesthesia Type: general ASA Status: 3            Anesthesia Type: No value filed.    Nadine Phase I:      Nadine Phase II:      Anesthesia Post Evaluation    Patient location during evaluation: PACU  Patient participation: complete - patient participated  Level of consciousness: sleepy but conscious  Airway patency: patent  Nausea & Vomiting: no nausea and no vomiting  Cardiovascular status: hemodynamically stable  Respiratory status: acceptable  Hydration status: euvolemic  Pain management: adequate    No notable events documented.

## 2024-01-20 NOTE — DISCHARGE SUMMARY
Physician Discharge Summary     Patient ID:  Azar Aguiar  [id number]  53 y.o.  1970    Admit date: 1/19/2024    Discharge date and time: 1/20/24    Admitting Physician: Jayesh Smallwood MD     Discharge Physician: Jayesh Smallwood MD    Admission Diagnoses: Spinal stenosis of lumbar region, unspecified whether neurogenic claudication present [M48.061]  Lumbar stenosis with neurogenic claudication [M48.062]    Discharge Diagnoses: Spinal stenosis of lumbar region, unspecified whether neurogenic claudication present [M48.061]  Lumbar stenosis with neurogenic claudication [M48.062]    Hospital Course: Patient admitted for elective L2-5 laminectomy. Postop course uneventful. He has met d/d criteria on POD 1 and is ready for d/c home.    Consults:  IM, PT    Treatments: none  Condition: good    Disposition: home    Patient Instructions:   Meds: see list  Activity: WBAT  Diet: regular  Wound Care: dry dressing daily x 1wk    Follow-up 2 weeks  Signed:  DEWEY COLMENARES PA-C  1/20/2024  10:45 AM

## 2024-01-20 NOTE — PROGRESS NOTES
[facility]  Office: [phone]  Tomás Smith DO, Jose Francisco Samaniego DO, Sean Hernandez DO, Adrian Whipple DO, Karina Rich MD, Eulalia Pichardo MD, Twila Botello MD, Kellie Valentino MD,  Yoni Han MD, Jasper Cornejo MD, Meet Enamorado MD,  Chiqui Luque DO, Adan Fleming MD, Jorden Hanley MD, Leon Smith DO, Leelee Ocampo MD,  Darrin Ron DO, Lynnette English MD, Alicia Cee MD, Monica Esparza MD, Gaby Luo MD,  Erich Downing MD, Aline Black MD, Shola Varghese MD, Kervin Caldwell MD, Joe Tran MD, Cindy Gold MD, Reed Caballero DO, Lee Benavidez DO, Ирина Calderon MD,  Giuseppe Kahn MD, Shirley Waterhouse, CNP,  Geneva Benavides, CNP, Manuel Chaudhary, CNP,  Angela Stratton, DNP, Mary Beth Paniagua, CNP, Alycia Malhotra, CNP, Zainab Milner CNP, Sally Amaro, CNP, Angela Yanes, CNP, Anila Anna, PA-C, Radha Rice, PA-C, Rosa Isela Guallpa, CNP, Yessy Pizarro, CNS, Irene Oliver, CNP, Tish Armendariz, CNP, Tracy Schwab, CNP         [facility]   IN-PATIENT SERVICE   [facility]    Progress Note    1/20/2024    7:53 AM    Name:   Azar Aguiar  MRN:     [id number]     Acct:      [id number]   Room:   2011/2011-02  IP Day:  0  Admit Date:  1/19/2024 11:28 AM    PCP:   Obey Carmona MD  Code Status:  Full Code    Subjective:     Admitted for neurogenic claudication/spinal stenosis of lumbar region s/p L2-5 lumbar laminectomy  Medicine consulted for management of UTI and blood pressure.    Interval History Status: improved.   Patient seen and examined bedside this morning.  Stable postop.  Blood pressure stable.  Denies any new current issues.  Continued on Cipro for UTI.  Continued on home medications for blood pressure.  Rest of the care per primary    Brief History:     The patient presented today for a scheduled lumbar laminectomy related to spinal stenosis. Per previous documentation the patient was recently admitted at [facility] for

## 2024-01-20 NOTE — PROGRESS NOTES
Writer reviewed discharge instructions with patient he verbalized understanding. Patient sent home with belongings, dressing supplies , hibiclens and scripts for Percocet, Flexeril, and Senakot.
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
## 2024-01-20 NOTE — CARE COORDINATION
Case Management Assessment  Initial Evaluation    Date/Time of Evaluation: 1/20/2024 12:49 PM  Assessment Completed by: BARBARA TADEO RN    If patient is discharged prior to next notation, then this note serves as note for discharge by case management.    Patient Name: Azar Aguiar                   YOB: 1970  Diagnosis: Spinal stenosis of lumbar region, unspecified whether neurogenic claudication present [M48.061]  Lumbar stenosis with neurogenic claudication [M48.062]                   Date / Time: 1/19/2024 11:28 AM    Patient Admission Status: Outpatient in a bed   Readmission Risk (Low < 19, Mod (19-27), High > 27): No data recorded  Current PCP: Obey Carmona MD  PCP verified by CM? Yes    Chart Reviewed: Yes      History Provided by: Patient  Patient Orientation: Alert and Oriented    Patient Cognition: Alert    Hospitalization in the last 30 days (Readmission):  No    If yes, Readmission Assessment in CM Navigator will be completed.    Advance Directives:      Code Status: Full Code   Patient's Primary Decision Maker is: Legal Next of Kin      Discharge Planning:    Patient lives with: Alone (going to stay at [facility]) Type of Home: House  Primary Care Giver: Self  Patient Support Systems include: Parent   Current Financial resources: Other (Comment) (BCBS)  Current community resources: None  Current services prior to admission: Durable Medical Equipment            Current DME: Walker, Cane, Shower Chair            Type of Home Care services:  None    ADLS  Prior functional level: Independent in ADLs/IADLs  Current functional level: Assistance with the following:, Shopping, Housework, Cooking    PT AM-PAC: 22 /24  OT AM-PAC:   /24    Family can provide assistance at DC: Yes  Would you like Case Management to discuss the discharge plan with any other family members/significant others, and if so, who? No  Plans to Return to Present Housing: Yes  Other Identified Issues/Barriers

## 2024-01-20 NOTE — PROGRESS NOTES
[facility]  Attending Progress Note  1/20/2024  10:35 AM     Azar Aguiar    1970   [id number]      SUBJECTIVE:  POD 1. Lumbar soreness, pain controlled. Denies LE numbness or tingling, but feel slightly weak. Ambulating without issue. Voiding. No other complaints. Ready to go home.    OBJECTIVE      Physical      VITALS:  /75   Pulse 68   Temp 98.1 °F (36.7 °C) (Oral)   Resp 18   Ht 1.88 m (6' 2\")   Wt 107 kg (236 lb)   SpO2 94%   BMI 30.30 kg/m²     Sitting up in chair  Dressing C/D/I  Drain intact to suction, pulled without complication, new dressing applied    NEUROLOGIC: Alert and Oriented x 3.  Strength 5/5 HF, 5/5 Q, 5/5 TA, 5/5 EHL, 5/5 GS.   BLE sensation intact.     Data  CBC:   Lab Results   Component Value Date/Time    WBC 8.2 01/20/2024 05:48 AM    RBC 2.92 01/20/2024 05:48 AM    RBC 4.22 08/16/2023 08:02 AM    HGB 10.6 01/20/2024 05:48 AM    HCT 30.0 01/20/2024 05:48 AM    .7 01/20/2024 05:48 AM    MCH 36.3 01/20/2024 05:48 AM    MCHC 35.3 01/20/2024 05:48 AM    RDW 12.0 01/20/2024 05:48 AM     01/20/2024 05:48 AM    MPV 9.4 01/20/2024 05:48 AM     CMP:    Lab Results   Component Value Date/Time     01/20/2024 05:48 AM    K 4.1 01/20/2024 05:48 AM     01/20/2024 05:48 AM    CO2 29 01/20/2024 05:48 AM    BUN 10 01/20/2024 05:48 AM    CREATININE 0.9 01/20/2024 05:48 AM    GFRAA 110.6 08/16/2023 08:02 AM    AGRATIO 2.0 12/21/2023 01:30 PM    LABGLOM >60 01/20/2024 05:48 AM    GLUCOSE 140 01/20/2024 05:48 AM    GLUCOSE 95 08/16/2023 08:02 AM    PROT 7.5 12/21/2023 01:30 PM    LABALBU 4.7 12/21/2023 01:30 PM    LABALBU 4.4 08/16/2023 08:02 AM    LABALBU 30 08/16/2023 08:02 AM    CALCIUM 8.1 01/20/2024 05:48 AM    BILITOT 1.7 12/21/2023 01:30 PM    ALKPHOS 70 12/21/2023 01:30 PM    AST 52 12/21/2023 01:30 PM    ALT 56 12/21/2023 01:30 PM           Current Inpatient Medications    Current Facility-Administered Medications: ciprofloxacin (CIPRO)

## 2024-01-20 NOTE — PROGRESS NOTES
Physical Therapy  Facility/Department: [facility]  Physical Therapy Initial Assessment    Name: Azar Aguiar  : 1970  MRN: [id number]  Date of Service: 2024    Patient s/p L2-L5 laminectomy 23             Patient Diagnosis(es): The encounter diagnosis was Lumbar stenosis with neurogenic claudication.  Past Medical History:  has a past medical history of Chronic back pain, History of blood transfusion, Hypertension, PVC (premature ventricular contraction), Seasonal allergies, Tachycardia, and Tachycardia, unspecified.  Past Surgical History:  has a past surgical history that includes fracture surgery; Knee arthroscopy (Right); Femur fracture surgery (Right); Femur fracture surgery (Right, ); Tonsillectomy; Colonoscopy; Esophagogastroduodenoscopy; and Pain management procedure.    Assessment   Body Structures, Functions, Activity Limitations Requiring Skilled Therapeutic Intervention: Decreased functional mobility   Assessment: Skilled therapy needed to maximize independence with functional mobility, patient currently SBA with mobility with RW.  Therapy Prognosis: Good  Decision Making: Medium Complexity  Exam: AROM, strength, endurance, balance, mobility, AM-PAC  Requires PT Follow-Up: Yes  Activity Tolerance  Activity Tolerance: Patient tolerated treatment well     Plan   Physical Therapy Plan  General Plan: 6-7 times per week  Current Treatment Recommendations: Balance training, Gait training, Transfer training, Stair training, Positioning, Therapeutic activities, Patient/Caregiver education & training, Safety education & training  Safety Devices  Type of Devices: Gait belt, Nurse notified, Call light within reach, Left in chair     Restrictions  Restrictions/Precautions  Restrictions/Precautions: General Precautions  Position Activity Restriction  Spinal Precautions: No Bending, No Lifting, No Twisting     Subjective   General  Chart Reviewed: Yes  Patient assessed for rehabilitation

## 2024-01-20 NOTE — PROGRESS NOTES
Patient admitted to room 2011 bed 2 from PACU per bed. Vitals and general assessment completed as charted. Patient oriented to room, call light, and bed controls. Call light placed within reach, bed in lowest position, and side rails up x 2.

## 2024-02-18 PROBLEM — N39.0 UTI (URINARY TRACT INFECTION): Status: RESOLVED | Noted: 2024-01-19 | Resolved: 2024-02-18

## 2024-04-15 NOTE — PLAN OF CARE
Problem: Pain  Goal: Verbalizes/displays adequate comfort level or baseline comfort level  1/20/2024 1016 by Morteza Callahan, RN  Outcome: Progressing  Flowsheets (Taken 1/20/2024 1016)  Verbalizes/displays adequate comfort level or baseline comfort level:   Encourage patient to monitor pain and request assistance   Assess pain using appropriate pain scale   Administer analgesics based on type and severity of pain and evaluate response   Implement non-pharmacological measures as appropriate and evaluate response  Note: Patient s/p Lumbar Laminectomy. Patient denies the need for pain medication. Patient has Percocet PRn for pain control  1/20/2024 0407 by Beryl Rascon, RN  Outcome: Progressing  Flowsheets (Taken 1/20/2024 0407)  Verbalizes/displays adequate comfort level or baseline comfort level:   Encourage patient to monitor pain and request assistance   Assess pain using appropriate pain scale   Administer analgesics based on type and severity of pain and evaluate response   Implement non-pharmacological measures as appropriate and evaluate response     
  Problem: Pain  Goal: Verbalizes/displays adequate comfort level or baseline comfort level  Outcome: Progressing  Flowsheets (Taken 1/20/2024 0407)  Verbalizes/displays adequate comfort level or baseline comfort level:   Encourage patient to monitor pain and request assistance   Assess pain using appropriate pain scale   Administer analgesics based on type and severity of pain and evaluate response   Implement non-pharmacological measures as appropriate and evaluate response     Problem: ABCDS Injury Assessment  Goal: Absence of physical injury  Outcome: Progressing  Flowsheets (Taken 1/20/2024 0407)  Absence of Physical Injury: Implement safety measures based on patient assessment     
normal...

## 2025-08-04 ENCOUNTER — HOSPITAL ENCOUNTER (EMERGENCY)
Age: 55
Discharge: HOME | End: 2025-08-04
Attending: STUDENT IN AN ORGANIZED HEALTH CARE EDUCATION/TRAINING PROGRAM
Payer: COMMERCIAL

## 2025-08-04 ENCOUNTER — APPOINTMENT (OUTPATIENT)
Dept: GENERAL RADIOLOGY | Age: 55
End: 2025-08-04
Payer: COMMERCIAL

## 2025-08-04 VITALS
HEIGHT: 74 IN | RESPIRATION RATE: 18 BRPM | BODY MASS INDEX: 26.95 KG/M2 | HEART RATE: 70 BPM | DIASTOLIC BLOOD PRESSURE: 88 MMHG | OXYGEN SATURATION: 100 % | SYSTOLIC BLOOD PRESSURE: 136 MMHG | WEIGHT: 210 LBS | TEMPERATURE: 97.7 F

## 2025-08-04 DIAGNOSIS — R42 DIZZINESS: Primary | ICD-10-CM

## 2025-08-04 DIAGNOSIS — R42 LIGHTHEADEDNESS: ICD-10-CM

## 2025-08-04 LAB
ANION GAP SERPL CALCULATED.3IONS-SCNC: 17 MMOL/L (ref 9–16)
BASOPHILS # BLD: 0.03 K/UL (ref 0–0.2)
BASOPHILS NFR BLD: 1 % (ref 0–2)
BUN SERPL-MCNC: 7 MG/DL (ref 6–20)
CALCIUM SERPL-MCNC: 8.9 MG/DL (ref 8.6–10.4)
CHLORIDE SERPL-SCNC: 101 MMOL/L (ref 98–107)
CO2 SERPL-SCNC: 22 MMOL/L (ref 20–31)
CREAT SERPL-MCNC: 0.9 MG/DL (ref 0.7–1.2)
EOSINOPHIL # BLD: 0.16 K/UL (ref 0–0.44)
EOSINOPHILS RELATIVE PERCENT: 3 % (ref 1–4)
ERYTHROCYTE [DISTWIDTH] IN BLOOD BY AUTOMATED COUNT: 12.1 % (ref 11.8–14.4)
GFR, ESTIMATED: >90 ML/MIN/1.73M2
GLUCOSE SERPL-MCNC: 98 MG/DL (ref 74–99)
HCT VFR BLD AUTO: 38.7 % (ref 40.7–50.3)
HGB BLD-MCNC: 13.6 G/DL (ref 13–17)
IMM GRANULOCYTES # BLD AUTO: <0.03 K/UL (ref 0–0.3)
IMM GRANULOCYTES NFR BLD: 0 %
LYMPHOCYTES NFR BLD: 1.69 K/UL (ref 1.1–3.7)
LYMPHOCYTES RELATIVE PERCENT: 31 % (ref 24–43)
MCH RBC QN AUTO: 36.5 PG (ref 25.2–33.5)
MCHC RBC AUTO-ENTMCNC: 35.1 G/DL (ref 28.4–34.8)
MCV RBC AUTO: 103.8 FL (ref 82.6–102.9)
MONOCYTES NFR BLD: 0.44 K/UL (ref 0.1–1.2)
MONOCYTES NFR BLD: 8 % (ref 3–12)
NEUTROPHILS NFR BLD: 57 % (ref 36–65)
NEUTS SEG NFR BLD: 3.16 K/UL (ref 1.5–8.1)
NRBC BLD-RTO: 0 PER 100 WBC
PLATELET # BLD AUTO: 178 K/UL (ref 138–453)
PMV BLD AUTO: 9.7 FL (ref 8.1–13.5)
POTASSIUM SERPL-SCNC: 3.9 MMOL/L (ref 3.7–5.3)
RBC # BLD AUTO: 3.73 M/UL (ref 4.21–5.77)
RBC # BLD: ABNORMAL 10*6/UL
SODIUM SERPL-SCNC: 140 MMOL/L (ref 136–145)
TROPONIN I SERPL HS-MCNC: 10 NG/L (ref 0–22)
TROPONIN I SERPL HS-MCNC: 10 NG/L (ref 0–22)
WBC OTHER # BLD: 5.5 K/UL (ref 3.5–11.3)

## 2025-08-04 PROCEDURE — 85025 COMPLETE CBC W/AUTO DIFF WBC: CPT

## 2025-08-04 PROCEDURE — 84484 ASSAY OF TROPONIN QUANT: CPT

## 2025-08-04 PROCEDURE — 80048 BASIC METABOLIC PNL TOTAL CA: CPT

## 2025-08-04 PROCEDURE — 2580000003 HC RX 258

## 2025-08-04 PROCEDURE — 6370000000 HC RX 637 (ALT 250 FOR IP)

## 2025-08-04 PROCEDURE — 71045 X-RAY EXAM CHEST 1 VIEW: CPT

## 2025-08-04 PROCEDURE — 93005 ELECTROCARDIOGRAM TRACING: CPT | Performed by: STUDENT IN AN ORGANIZED HEALTH CARE EDUCATION/TRAINING PROGRAM

## 2025-08-04 PROCEDURE — 99284 EMERGENCY DEPT VISIT MOD MDM: CPT

## 2025-08-04 RX ORDER — IBUPROFEN 400 MG/1
400 TABLET, FILM COATED ORAL ONCE
Status: COMPLETED | OUTPATIENT
Start: 2025-08-04 | End: 2025-08-04

## 2025-08-04 RX ORDER — MECLIZINE HCL 12.5 MG 12.5 MG/1
25 TABLET ORAL 3 TIMES DAILY PRN
Qty: 10 TABLET | Refills: 0 | Status: SHIPPED | OUTPATIENT
Start: 2025-08-04

## 2025-08-04 RX ORDER — 0.9 % SODIUM CHLORIDE 0.9 %
1000 INTRAVENOUS SOLUTION INTRAVENOUS ONCE
Status: COMPLETED | OUTPATIENT
Start: 2025-08-04 | End: 2025-08-04

## 2025-08-04 RX ORDER — ACETAMINOPHEN 325 MG/1
650 TABLET ORAL ONCE
Status: COMPLETED | OUTPATIENT
Start: 2025-08-04 | End: 2025-08-04

## 2025-08-04 RX ADMIN — ACETAMINOPHEN 650 MG: 325 TABLET ORAL at 16:10

## 2025-08-04 RX ADMIN — SODIUM CHLORIDE 1000 ML: 9 INJECTION, SOLUTION INTRAVENOUS at 16:34

## 2025-08-04 RX ADMIN — IBUPROFEN 400 MG: 400 TABLET ORAL at 16:10

## 2025-08-04 ASSESSMENT — ENCOUNTER SYMPTOMS
ABDOMINAL PAIN: 0
VOMITING: 0
ANAL BLEEDING: 0
SHORTNESS OF BREATH: 1
NAUSEA: 0
BLOOD IN STOOL: 0
DIARRHEA: 0
COUGH: 0
WHEEZING: 0
STRIDOR: 0
CHOKING: 0
ABDOMINAL DISTENTION: 0
CHEST TIGHTNESS: 0

## 2025-08-04 ASSESSMENT — PAIN - FUNCTIONAL ASSESSMENT
PAIN_FUNCTIONAL_ASSESSMENT: ACTIVITIES ARE NOT PREVENTED
PAIN_FUNCTIONAL_ASSESSMENT: 0-10

## 2025-08-04 ASSESSMENT — PAIN DESCRIPTION - LOCATION: LOCATION: HEAD

## 2025-08-04 ASSESSMENT — PAIN SCALES - GENERAL: PAINLEVEL_OUTOF10: 3

## 2025-08-04 ASSESSMENT — PAIN DESCRIPTION - ORIENTATION: ORIENTATION: ANTERIOR

## 2025-08-06 LAB
EKG ATRIAL RATE: 75 BPM
EKG P AXIS: 61 DEGREES
EKG P-R INTERVAL: 152 MS
EKG Q-T INTERVAL: 394 MS
EKG QRS DURATION: 82 MS
EKG QTC CALCULATION (BAZETT): 439 MS
EKG R AXIS: 44 DEGREES
EKG T AXIS: 46 DEGREES
EKG VENTRICULAR RATE: 75 BPM